# Patient Record
Sex: FEMALE | Race: WHITE | NOT HISPANIC OR LATINO | URBAN - NONMETROPOLITAN AREA
[De-identification: names, ages, dates, MRNs, and addresses within clinical notes are randomized per-mention and may not be internally consistent; named-entity substitution may affect disease eponyms.]

---

## 2022-09-12 ENCOUNTER — APPOINTMENT (RX ONLY)
Dept: URBAN - NONMETROPOLITAN AREA CLINIC 28 | Facility: CLINIC | Age: 67
Setting detail: DERMATOLOGY
End: 2022-09-12

## 2022-09-12 DIAGNOSIS — Z00.8 ENCOUNTER FOR OTHER GENERAL EXAMINATION: ICD-10-CM

## 2022-09-12 DIAGNOSIS — L40.8 OTHER PSORIASIS: ICD-10-CM

## 2022-09-12 PROCEDURE — ? COUNSELING

## 2022-09-12 PROCEDURE — 99203 OFFICE O/P NEW LOW 30 MIN: CPT

## 2022-09-12 PROCEDURE — ? OTHER

## 2022-09-12 PROCEDURE — ? OBSERVATION

## 2022-09-12 PROCEDURE — ? TREATMENT REGIMEN

## 2022-09-12 PROCEDURE — ? PRESCRIPTION

## 2022-09-12 RX ORDER — CALCIPOTRIENE 50 UG/G
OINTMENT TOPICAL
Qty: 120 | Refills: 3 | Status: ERX | COMMUNITY
Start: 2022-09-12

## 2022-09-12 RX ORDER — FLUOCINOLONE ACETONIDE 0.11 MG/ML
OIL TOPICAL
Qty: 118.28 | Refills: 3 | Status: ERX | COMMUNITY
Start: 2022-09-12

## 2022-09-12 RX ADMIN — FLUOCINOLONE ACETONIDE: 0.11 OIL TOPICAL at 00:00

## 2022-09-12 RX ADMIN — CALCIPOTRIENE: 50 OINTMENT TOPICAL at 00:00

## 2022-09-12 ASSESSMENT — LOCATION SIMPLE DESCRIPTION DERM
LOCATION SIMPLE: LEFT ELBOW
LOCATION SIMPLE: ABDOMEN
LOCATION SIMPLE: GLUTEAL CLEFT
LOCATION SIMPLE: RIGHT BREAST
LOCATION SIMPLE: RIGHT ELBOW
LOCATION SIMPLE: GROIN
LOCATION SIMPLE: LEFT BREAST

## 2022-09-12 ASSESSMENT — LOCATION DETAILED DESCRIPTION DERM
LOCATION DETAILED: RIGHT AXILLARY TAIL OF BREAST
LOCATION DETAILED: LEFT AXILLARY TAIL OF BREAST
LOCATION DETAILED: RIGHT ELBOW
LOCATION DETAILED: LEFT RIB CAGE
LOCATION DETAILED: GLUTEAL CLEFT
LOCATION DETAILED: LEFT ELBOW
LOCATION DETAILED: LEFT INGUINAL CREASE
LOCATION DETAILED: RIGHT INGUINAL CREASE
LOCATION DETAILED: RIGHT INFRAMAMMARY CREASE (INNER QUADRANT)

## 2022-09-12 ASSESSMENT — LOCATION ZONE DERM
LOCATION ZONE: ARM
LOCATION ZONE: TRUNK

## 2022-09-12 NOTE — PROCEDURE: MIPS QUALITY
Detail Level: Detailed
Quality 110: Preventive Care And Screening: Influenza Immunization: Influenza Immunization not Administered for Documented Reasons.
Additional Notes: Pt has received Covid Vaccines
Quality 111:Pneumonia Vaccination Status For Older Adults: Pneumococcal vaccine (PPSV23) was not administered on or after patient’s 60th birthday and before the end of the measurement period, reason not otherwise specified

## 2022-09-12 NOTE — PROCEDURE: TREATMENT REGIMEN
Action 2: Continue
Start Regimen: Derma-Smoothe/FS Scalp Oil 0.01 % \\nQuantity: 118.28 ml\\nSig: Apply to affected areas of scalp, ears, arms 1-2 times daily x 2 weeks, then prn flaring and itching\\n\\ncalcipotriene 0.005 % topical ointment \\nQuantity: 120.0 g  Days Supply: 30\\nSig: Apply 1-2 times daily to breast and axilla areas x 2 weeks, then prn flaring.
Detail Level: Zone

## 2022-10-18 ENCOUNTER — APPOINTMENT (RX ONLY)
Dept: URBAN - NONMETROPOLITAN AREA CLINIC 28 | Facility: CLINIC | Age: 67
Setting detail: DERMATOLOGY
End: 2022-10-18

## 2022-10-18 DIAGNOSIS — Z00.8 ENCOUNTER FOR OTHER GENERAL EXAMINATION: ICD-10-CM

## 2022-10-18 DIAGNOSIS — L40.8 OTHER PSORIASIS: ICD-10-CM | Status: IMPROVED

## 2022-10-18 PROCEDURE — 99213 OFFICE O/P EST LOW 20 MIN: CPT

## 2022-10-18 PROCEDURE — ? OTHER

## 2022-10-18 PROCEDURE — ? OBSERVATION

## 2022-10-18 PROCEDURE — ? COUNSELING

## 2022-10-18 PROCEDURE — ? PRESCRIPTION

## 2022-10-18 PROCEDURE — ? TREATMENT REGIMEN

## 2022-10-18 RX ORDER — CALCIPOTRIENE 50 UG/G
OINTMENT TOPICAL
Qty: 120 | Refills: 3 | Status: ERX

## 2022-10-18 RX ORDER — FLUOCINOLONE ACETONIDE 0.11 MG/ML
OIL TOPICAL
Qty: 118.28 | Refills: 3 | Status: CANCELLED

## 2022-10-18 ASSESSMENT — LOCATION DETAILED DESCRIPTION DERM
LOCATION DETAILED: RIGHT INGUINAL CREASE
LOCATION DETAILED: LEFT ELBOW
LOCATION DETAILED: LEFT INGUINAL CREASE
LOCATION DETAILED: RIGHT AXILLARY TAIL OF BREAST
LOCATION DETAILED: RIGHT INFRAMAMMARY CREASE (INNER QUADRANT)
LOCATION DETAILED: GLUTEAL CLEFT
LOCATION DETAILED: RIGHT ELBOW
LOCATION DETAILED: LEFT AXILLARY TAIL OF BREAST
LOCATION DETAILED: LEFT RIB CAGE

## 2022-10-18 ASSESSMENT — LOCATION SIMPLE DESCRIPTION DERM
LOCATION SIMPLE: RIGHT ELBOW
LOCATION SIMPLE: LEFT BREAST
LOCATION SIMPLE: ABDOMEN
LOCATION SIMPLE: RIGHT BREAST
LOCATION SIMPLE: GLUTEAL CLEFT
LOCATION SIMPLE: LEFT ELBOW
LOCATION SIMPLE: GROIN

## 2022-10-18 ASSESSMENT — LOCATION ZONE DERM
LOCATION ZONE: ARM
LOCATION ZONE: TRUNK

## 2022-10-18 NOTE — PROCEDURE: TREATMENT REGIMEN
Action 2: Continue
Detail Level: Zone
Continue Regimen: Derma-Smoothe/FS Scalp Oil 0.01 % \\nQuantity: 118.28 ml\\nSig: Apply to affected areas of scalp, ears, arms 1-2 times daily x 2 weeks, then prn flaring and itching\\n\\ncalcipotriene 0.005 % topical ointment \\nQuantity: 120.0 g  Days Supply: 30\\nSig: Apply 1-2 times daily to breast and axilla areas x 2 weeks, then prn flaring.

## 2023-04-18 ENCOUNTER — RX ONLY (OUTPATIENT)
Age: 68
Setting detail: RX ONLY
End: 2023-04-18

## 2023-04-18 ENCOUNTER — APPOINTMENT (RX ONLY)
Dept: URBAN - NONMETROPOLITAN AREA CLINIC 28 | Facility: CLINIC | Age: 68
Setting detail: DERMATOLOGY
End: 2023-04-18

## 2023-04-18 DIAGNOSIS — L40.8 OTHER PSORIASIS: ICD-10-CM

## 2023-04-18 PROCEDURE — ? COUNSELING

## 2023-04-18 PROCEDURE — ? PRESCRIPTION

## 2023-04-18 PROCEDURE — ? OBSERVATION

## 2023-04-18 PROCEDURE — 99213 OFFICE O/P EST LOW 20 MIN: CPT

## 2023-04-18 PROCEDURE — ? TREATMENT REGIMEN

## 2023-04-18 RX ORDER — FLUOCINOLONE ACETONIDE 0.11 MG/ML
OIL TOPICAL
Qty: 118.28 | Refills: 3 | Status: CANCELLED

## 2023-04-18 RX ORDER — CALCIPOTRIENE 50 UG/G
OINTMENT TOPICAL
Qty: 120 | Refills: 3 | Status: CANCELLED

## 2023-04-18 ASSESSMENT — LOCATION SIMPLE DESCRIPTION DERM
LOCATION SIMPLE: LEFT ELBOW
LOCATION SIMPLE: LEFT BREAST
LOCATION SIMPLE: ABDOMEN
LOCATION SIMPLE: GROIN
LOCATION SIMPLE: RIGHT ELBOW
LOCATION SIMPLE: RIGHT BREAST
LOCATION SIMPLE: GLUTEAL CLEFT

## 2023-04-18 ASSESSMENT — LOCATION DETAILED DESCRIPTION DERM
LOCATION DETAILED: LEFT INGUINAL CREASE
LOCATION DETAILED: RIGHT ELBOW
LOCATION DETAILED: LEFT ELBOW
LOCATION DETAILED: GLUTEAL CLEFT
LOCATION DETAILED: LEFT AXILLARY TAIL OF BREAST
LOCATION DETAILED: LEFT RIB CAGE
LOCATION DETAILED: RIGHT AXILLARY TAIL OF BREAST
LOCATION DETAILED: RIGHT INFRAMAMMARY CREASE (INNER QUADRANT)
LOCATION DETAILED: RIGHT INGUINAL CREASE

## 2023-04-18 ASSESSMENT — LOCATION ZONE DERM
LOCATION ZONE: ARM
LOCATION ZONE: TRUNK